# Patient Record
Sex: FEMALE | URBAN - METROPOLITAN AREA
[De-identification: names, ages, dates, MRNs, and addresses within clinical notes are randomized per-mention and may not be internally consistent; named-entity substitution may affect disease eponyms.]

---

## 2022-03-16 ENCOUNTER — OUT OF OFFICE VISIT (OUTPATIENT)
Dept: URBAN - METROPOLITAN AREA MEDICAL CENTER 16 | Facility: MEDICAL CENTER | Age: 82
End: 2022-03-16
Payer: MEDICARE

## 2022-03-16 DIAGNOSIS — K57.30 ACQUIRED DIVERTICULOSIS OF COLON: ICD-10-CM

## 2022-03-16 DIAGNOSIS — K92.1 ACUTE MELENA: ICD-10-CM

## 2022-03-16 DIAGNOSIS — Z79.01 ANTICOAGULANT LONG-TERM USE: ICD-10-CM

## 2022-03-16 PROCEDURE — 99214 OFFICE O/P EST MOD 30 MIN: CPT | Performed by: INTERNAL MEDICINE

## 2022-03-17 ENCOUNTER — OUT OF OFFICE VISIT (OUTPATIENT)
Dept: URBAN - METROPOLITAN AREA MEDICAL CENTER 16 | Facility: MEDICAL CENTER | Age: 82
End: 2022-03-17
Payer: MEDICARE

## 2022-03-17 DIAGNOSIS — Z79.01 ANTICOAGULANT LONG-TERM USE: ICD-10-CM

## 2022-03-17 DIAGNOSIS — K92.1 ACUTE MELENA: ICD-10-CM

## 2022-03-17 PROCEDURE — 99213 OFFICE O/P EST LOW 20 MIN: CPT

## 2022-03-18 ENCOUNTER — OUT OF OFFICE VISIT (OUTPATIENT)
Dept: URBAN - METROPOLITAN AREA MEDICAL CENTER 16 | Facility: MEDICAL CENTER | Age: 82
End: 2022-03-18
Payer: MEDICARE

## 2022-03-18 DIAGNOSIS — K92.1 ACUTE MELENA: ICD-10-CM

## 2022-03-18 DIAGNOSIS — D12.2 ADENOMA OF ASCENDING COLON: ICD-10-CM

## 2022-03-18 PROCEDURE — 45380 COLONOSCOPY AND BIOPSY: CPT | Performed by: INTERNAL MEDICINE

## 2024-09-06 ENCOUNTER — OFFICE VISIT (OUTPATIENT)
Dept: URBAN - METROPOLITAN AREA CLINIC 118 | Facility: CLINIC | Age: 84
End: 2024-09-06

## 2025-07-10 ENCOUNTER — LAB OUTSIDE AN ENCOUNTER (OUTPATIENT)
Dept: URBAN - METROPOLITAN AREA CLINIC 118 | Facility: CLINIC | Age: 85
End: 2025-07-10

## 2025-07-10 ENCOUNTER — OFFICE VISIT (OUTPATIENT)
Dept: URBAN - METROPOLITAN AREA CLINIC 118 | Facility: CLINIC | Age: 85
End: 2025-07-10
Payer: COMMERCIAL

## 2025-07-10 DIAGNOSIS — R10.30 LOWER ABDOMINAL PAIN: ICD-10-CM

## 2025-07-10 DIAGNOSIS — K59.00 ACUTE CONSTIPATION: ICD-10-CM

## 2025-07-10 PROBLEM — 197119006: Status: ACTIVE | Noted: 2025-07-10

## 2025-07-10 PROCEDURE — 99214 OFFICE O/P EST MOD 30 MIN: CPT

## 2025-07-10 RX ORDER — LATANOPROST 50 UG/ML
SOLUTION/ DROPS OPHTHALMIC
Qty: 7.5 MILLILITER | Status: ACTIVE | COMMUNITY

## 2025-07-10 RX ORDER — RIVAROXABAN 15 MG/1
TABLET, FILM COATED ORAL
Qty: 90 TABLET | Status: ACTIVE | COMMUNITY

## 2025-07-10 RX ORDER — AMLODIPINE BESYLATE 5 MG/1
TABLET ORAL
Qty: 90 TABLET | Status: ACTIVE | COMMUNITY

## 2025-07-10 RX ORDER — METOPROLOL TARTRATE 25 MG/1
TABLET ORAL
Qty: 180 TABLET | Status: ACTIVE | COMMUNITY

## 2025-07-10 RX ORDER — DICYCLOMINE HYDROCHLORIDE 20 MG/1
1 TABLET TABLET ORAL THREE TIMES A DAY
Qty: 90 | OUTPATIENT
Start: 2025-07-10

## 2025-07-10 RX ORDER — ATORVASTATIN CALCIUM 10 MG/1
TABLET, FILM COATED ORAL
Qty: 90 TABLET | Status: ACTIVE | COMMUNITY

## 2025-07-10 RX ORDER — BRIMONIDINE TARTRATE, TIMOLOL MALEATE 2; 5 MG/ML; MG/ML
SOLUTION/ DROPS OPHTHALMIC
Qty: 10 MILLILITER | Status: ACTIVE | COMMUNITY

## 2025-07-10 RX ORDER — AMIODARONE HYDROCHLORIDE 200 MG/1
TABLET ORAL
Qty: 90 TABLET | Status: ACTIVE | COMMUNITY

## 2025-07-10 NOTE — PHYSICAL EXAM GASTROINTESTINAL
Abdomen , soft, lower abdomen moderately TTP, nondistended , no guarding or rigidity , no masses palpable , normal bowel sounds , Liver and Spleen , no hepatosplenomegaly present, liver nontender Rectal  deferred

## 2025-07-10 NOTE — HPI-TODAY'S VISIT:
The patient is an 84-year-old female with a past medical history of GERD, atrial fibrillation on Xarelto, CKD, arthritis, IBS, hypertension, and hyperlipidemia who presents with her daughter for evaluation of abdominal pain. The pain began approximately 10 days ago, following an ER visit for a fall where she landed on her lower back / hip area. At the time of the ER evaluation, she did not have abdominal pain. However, symptoms developed shortly after she was given medication for pain (?morphine) and worsened when she returned home. A CT of the chest, abdomen, and pelvis with trauma protocol performed in the ER did not reveal a clear etiology for her abdominal discomfort, but was not having pain at time of imaging (performed immediately when she got to the ER).  She reports that the pain is primarily triggered by eating or even drinking water. It is most prominent in the mid-lower abdomen but can occasionally be felt in the upper abdomen intermittently. She denies experiencing similar symptoms prior to this episode. Her bowel movements occur once daily, consistent with her baseline. However, she has a history of alternating constipation and diarrhea and frequently experiences a sensation of incomplete evacuation and passage of small stools. She denies any blood in her stool. She has a long-standing history of reflux and heartburn, though these symptoms have not worsened recently. She uses Tums as needed. She also endorses chronic dysphagia localized to the esophagus, which has been present for several years. She previously underwent an EGD (timing unclear), and a colonoscopy was performed in 2022 during a hospitalization for hematochezia, suspected at the time to be secondary to diverticular bleeding.

## 2025-07-11 ENCOUNTER — TELEPHONE ENCOUNTER (OUTPATIENT)
Dept: URBAN - METROPOLITAN AREA CLINIC 118 | Facility: CLINIC | Age: 85
End: 2025-07-11

## 2025-07-25 ENCOUNTER — DASHBOARD ENCOUNTERS (OUTPATIENT)
Age: 85
End: 2025-07-25

## 2025-07-25 ENCOUNTER — OFFICE VISIT (OUTPATIENT)
Dept: URBAN - METROPOLITAN AREA CLINIC 118 | Facility: CLINIC | Age: 85
End: 2025-07-25
Payer: COMMERCIAL

## 2025-07-25 DIAGNOSIS — R10.30 LOWER ABDOMINAL PAIN: ICD-10-CM

## 2025-07-25 DIAGNOSIS — K59.00 ACUTE CONSTIPATION: ICD-10-CM

## 2025-07-25 DIAGNOSIS — K57.92 ACUTE DIVERTICULITIS: ICD-10-CM

## 2025-07-25 PROCEDURE — 99213 OFFICE O/P EST LOW 20 MIN: CPT

## 2025-07-25 RX ORDER — METOPROLOL TARTRATE 25 MG/1
TABLET ORAL
Qty: 180 TABLET | Status: ACTIVE | COMMUNITY

## 2025-07-25 RX ORDER — BRIMONIDINE TARTRATE, TIMOLOL MALEATE 2; 5 MG/ML; MG/ML
SOLUTION/ DROPS OPHTHALMIC
Qty: 10 MILLILITER | Status: ACTIVE | COMMUNITY

## 2025-07-25 RX ORDER — RIVAROXABAN 15 MG/1
TABLET, FILM COATED ORAL
Qty: 90 TABLET | Status: ACTIVE | COMMUNITY

## 2025-07-25 RX ORDER — LATANOPROST 50 UG/ML
SOLUTION/ DROPS OPHTHALMIC
Qty: 7.5 MILLILITER | Status: ACTIVE | COMMUNITY

## 2025-07-25 RX ORDER — ATORVASTATIN CALCIUM 10 MG/1
TABLET, FILM COATED ORAL
Qty: 90 TABLET | Status: ACTIVE | COMMUNITY

## 2025-07-25 RX ORDER — AMIODARONE HYDROCHLORIDE 200 MG/1
TABLET ORAL
Qty: 90 TABLET | Status: ACTIVE | COMMUNITY

## 2025-07-25 RX ORDER — DICYCLOMINE HYDROCHLORIDE 20 MG/1
1 TABLET TABLET ORAL THREE TIMES A DAY
Qty: 90 | Status: ACTIVE | COMMUNITY
Start: 2025-07-10

## 2025-07-25 RX ORDER — AMLODIPINE BESYLATE 5 MG/1
TABLET ORAL
Qty: 90 TABLET | Status: ACTIVE | COMMUNITY

## 2025-07-25 NOTE — PHYSICAL EXAM GASTROINTESTINAL
Abdomen , soft, LLQ very mildly TTP, nondistended , no guarding or rigidity , no masses palpable , normal bowel sounds , Liver and Spleen , no hepatosplenomegaly present, liver nontender Rectal  deferred

## 2025-07-25 NOTE — HPI-TODAY'S VISIT:
Patient is a 85 y/o female who presents today for follow up. Daughter present with her.  After last visit, symptoms acutely worsened and she went to the ER overnight. CTA A/P was ordered which revealed acute diverticulitis of the descending colon. She was given abx (Cipro/Flagyl) and discharged home. has now finished the abx.  Feeling significantly better today. Still with very mild discomfort in the LLQ, but slowly improving. She is having 1-2 BM per day, mix between formed and soft/loose. She denies GI bleeding, NV or fevers. Weight stable.  ------------------------------- Prior OV 7/10/25 The patient is an 84-year-old female with a past medical history of GERD, atrial fibrillation on Xarelto, CKD, arthritis, IBS, hypertension, and hyperlipidemia who presents with her daughter for evaluation of abdominal pain. The pain began approximately 10 days ago, following an ER visit for a fall where she landed on her lower back / hip area. At the time of the ER evaluation, she did not have abdominal pain. However, symptoms developed shortly after she was given medication for pain (?morphine) and worsened when she returned home. A CT of the chest, abdomen, and pelvis with trauma protocol performed in the ER did not reveal a clear etiology for her abdominal discomfort, but was not having pain at time of imaging (performed immediately when she got to the ER).  She reports that the pain is primarily triggered by eating or even drinking water. It is most prominent in the mid-lower abdomen but can occasionally be felt in the upper abdomen intermittently. She denies experiencing similar symptoms prior to this episode. Her bowel movements occur once daily, consistent with her baseline. However, she has a history of alternating constipation and diarrhea and frequently experiences a sensation of incomplete evacuation and passage of small stools. She denies any blood in her stool. She has a long-standing history of reflux and heartburn, though these symptoms have not worsened recently. She uses Tums as needed. She also endorses chronic dysphagia localized to the esophagus, which has been present for several years. She previously underwent an EGD (timing unclear), and a colonoscopy was performed in 2022 during a hospitalization for hematochezia, suspected at the time to be secondary to diverticular bleeding.